# Patient Record
(demographics unavailable — no encounter records)

---

## 2025-01-09 NOTE — ASSESSMENT
[FreeTextEntry1] : s/p Thyroid lobectomy daily care labs in office continue synthroid sono next visit

## 2025-01-09 NOTE — PHYSICAL EXAM
[de-identified] : well healed incision [Midline] : located in midline position [Normal] : orientation to person, place, and time: normal

## 2025-01-09 NOTE — HISTORY OF PRESENT ILLNESS
[de-identified] : Pt 4 months s/p thyroid lobectomy for NIFTP doing well c/o fatigue and hair loss since surgery